# Patient Record
Sex: MALE | Race: OTHER
[De-identification: names, ages, dates, MRNs, and addresses within clinical notes are randomized per-mention and may not be internally consistent; named-entity substitution may affect disease eponyms.]

---

## 2018-08-11 ENCOUNTER — HOSPITAL ENCOUNTER (EMERGENCY)
Dept: HOSPITAL 61 - ER | Age: 28
Discharge: HOME | End: 2018-08-11
Payer: SELF-PAY

## 2018-08-11 VITALS — HEIGHT: 72 IN | BODY MASS INDEX: 29.8 KG/M2 | WEIGHT: 220 LBS

## 2018-08-11 VITALS — SYSTOLIC BLOOD PRESSURE: 145 MMHG | DIASTOLIC BLOOD PRESSURE: 95 MMHG

## 2018-08-11 DIAGNOSIS — S61.216A: Primary | ICD-10-CM
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
## 2018-08-11 PROCEDURE — 96372 THER/PROPH/DIAG INJ SC/IM: CPT

## 2018-08-11 PROCEDURE — 90715 TDAP VACCINE 7 YRS/> IM: CPT

## 2018-08-11 PROCEDURE — 90471 IMMUNIZATION ADMIN: CPT

## 2018-08-11 PROCEDURE — 99284 EMERGENCY DEPT VISIT MOD MDM: CPT

## 2018-08-11 NOTE — PHYS DOC
Past Medical History


Past Medical History:  No Pertinent History


Past Surgical History:  No Surgical History


Alcohol Use:  Occasionally


Drug Use:  None





Adult General


Chief Complaint


Chief Complaint:  LACERATION/AVULSION





HPI


HPI





Patient is a 28  year old [f__sex] who presents with []





Review of Systems


Review of Systems





Constitutional: Denies fever or chills []


Eyes: Denies change in visual acuity, redness, or eye pain []


HENT: Denies nasal congestion or sore throat []


Respiratory: Denies cough or shortness of breath []


Cardiovascular: No additional information not addressed in HPI []


GI: Denies abdominal pain, nausea, vomiting, bloody stools or diarrhea []


: Denies dysuria or hematuria []


Musculoskeletal: Denies back pain or joint pain []


Integument: Denies rash or skin lesions []


Neurologic: Denies headache, focal weakness or sensory changes []


Endocrine: Denies polyuria or polydipsia []





All other systems were reviewed and found to be within normal limits, except as 

documented in this note.





Current Medications


Current Medications





Current Medications








 Medications


  (Trade)  Dose


 Ordered  Sig/Evelyn  Start Time


 Stop Time Status Last Admin


Dose Admin


 


 Diphtheria/


 Tetanus/Acell


 Pertussis


  (Boostrix)  0.5 ml  ONCE ONCE  8/11/18 20:00


 8/11/18 20:19 DC 8/11/18 20:53


0.5 ML


 


 Lidocaine/Sodium


 Bicarbonate


  (Buffered


 Lidocaine 1%)  3 ml  1X  ONCE  8/11/18 20:00


 8/11/18 20:19 DC 8/11/18 20:52


3 ML


 


 Neomycin/


 Polymyxin/


 Bacitracin


  (Triple


 Antibiotic


 Ointment)  1 pkt  1X  ONCE  8/11/18 20:00


 8/11/18 20:19 DC 8/11/18 20:52


1 PKT











Allergies


Allergies





Allergies








Coded Allergies Type Severity Reaction Last Updated Verified


 


  No Known Drug Allergies    8/11/18 No











Physical Exam


Physical Exam





Constitutional: Well developed, well nourished, no acute distress, non-toxic 

appearance. []


HENT: Normocephalic, atraumatic, bilateral external ears normal, oropharynx 

moist, no oral exudates, nose normal. []


Eyes: PERRLA, EOMI, conjunctiva normal, no discharge. [] 


Neck: Normal range of motion, no tenderness, supple, no stridor. [] 


Cardiovascular:Heart rate regular rhythm, no murmur []


Lungs & Thorax:  Bilateral breath sounds clear to auscultation []


Abdomen: Bowel sounds normal, soft, no tenderness, no masses, no pulsatile 

masses. [] 


Skin: Warm, dry, no erythema, no rash. [] 


Back: No tenderness, no CVA tenderness. [] 


Extremities: No tenderness, no cyanosis, no clubbing, ROM intact, no edema. [] 


Neurologic: Alert and oriented X 3, normal motor function, normal sensory 

function, no focal deficits noted. []


Psychologic: Affect normal, judgement normal, mood normal. []





Current Patient Data


Vital Signs





 Vital Signs








  Date Time  Temp Pulse Resp B/P (MAP) Pulse Ox O2 Delivery O2 Flow Rate FiO2


 


8/11/18 20:00 98.4 87 20 145/95 (112) 99 Room Air  





 98.4       











EKG


EKG


[]





Radiology/Procedures


Radiology/Procedures


[]





Course & Med Decision Making


Course & Med Decision Making


Pertinent Labs and Imaging studies reviewed. (See chart for details)





[]





Dragon Disclaimer


Dragon Disclaimer


This electronic medical record was generated, in whole or in part, using a 

voice recognition dictation system.





Departure


Departure


Impression:  


 Primary Impression:  


 Laceration of finger of right hand without foreign body


Disposition:  01 HOME, SELF-CARE


Condition:  STABLE


Referrals:  


NO PCP (PCP)


Patient Instructions:  Laceration Care, Adult, Easy-to-Read





Additional Instructions:  


You may take Tylenol or ibuprofen as needed for pain, wear the aluminum finger 

splint was placed in the ER until your sutures are removed. Follow-up with your 

primary care doctor in 1-2 days for wound recheck, have the sutures removed in 

10 days. Return to the ER if your symptoms worsen





Problem Qualifiers








 Primary Impression:  


 Laceration of finger of right hand without foreign body


 Encounter type:  initial encounter  Finger:  little finger  Damage to nail 

status:  without damage  Qualified Codes:  S61.216A - Laceration without 

foreign body of right little finger without damage to nail, initial encounter








SANTIAGO STRANGE Aug 11, 2018 20:22